# Patient Record
Sex: MALE | Race: WHITE | HISPANIC OR LATINO | URBAN - METROPOLITAN AREA
[De-identification: names, ages, dates, MRNs, and addresses within clinical notes are randomized per-mention and may not be internally consistent; named-entity substitution may affect disease eponyms.]

---

## 2022-09-23 ENCOUNTER — ANESTHESIA (OUTPATIENT)
Dept: SURGERY | Facility: HOSPITAL | Age: 27
End: 2022-09-23

## 2022-09-23 ENCOUNTER — ANESTHESIA EVENT (OUTPATIENT)
Dept: SURGERY | Facility: HOSPITAL | Age: 27
End: 2022-09-23

## 2022-09-23 ENCOUNTER — HOSPITAL ENCOUNTER (INPATIENT)
Facility: HOSPITAL | Age: 27
LOS: 1 days | Discharge: HOME | DRG: 041 | End: 2022-09-23
Attending: EMERGENCY MEDICINE | Admitting: STUDENT IN AN ORGANIZED HEALTH CARE EDUCATION/TRAINING PROGRAM

## 2022-09-23 VITALS
OXYGEN SATURATION: 96 % | TEMPERATURE: 98 F | HEIGHT: 67 IN | DIASTOLIC BLOOD PRESSURE: 99 MMHG | SYSTOLIC BLOOD PRESSURE: 159 MMHG | HEART RATE: 83 BPM | BODY MASS INDEX: 26.68 KG/M2 | WEIGHT: 170 LBS | RESPIRATION RATE: 16 BRPM

## 2022-09-23 DIAGNOSIS — S64.40XA DIGITAL NERVE LACERATION, FINGER, INITIAL ENCOUNTER: ICD-10-CM

## 2022-09-23 DIAGNOSIS — S68.127A: ICD-10-CM

## 2022-09-23 DIAGNOSIS — S65.517A LACERATION OF BLOOD VESSEL OF LEFT LITTLE FINGER, INITIAL ENCOUNTER: Primary | ICD-10-CM

## 2022-09-23 LAB
ALBUMIN SERPL-MCNC: 3.5 GM/DL (ref 3.5–5)
ALBUMIN/GLOB SERPL: 0.9 RATIO (ref 1.1–2)
ALP SERPL-CCNC: 108 UNIT/L (ref 40–150)
ALT SERPL-CCNC: 68 UNIT/L (ref 0–55)
AST SERPL-CCNC: 54 UNIT/L (ref 5–34)
BASOPHILS # BLD AUTO: 0.04 X10(3)/MCL (ref 0–0.2)
BASOPHILS NFR BLD AUTO: 0.4 %
BILIRUBIN DIRECT+TOT PNL SERPL-MCNC: 1.2 MG/DL
BUN SERPL-MCNC: 10.7 MG/DL (ref 8.9–20.6)
CALCIUM SERPL-MCNC: 9 MG/DL (ref 8.4–10.2)
CHLORIDE SERPL-SCNC: 101 MMOL/L (ref 98–107)
CO2 SERPL-SCNC: 25 MMOL/L (ref 22–29)
CREAT SERPL-MCNC: 0.72 MG/DL (ref 0.73–1.18)
EOSINOPHIL # BLD AUTO: 0.36 X10(3)/MCL (ref 0–0.9)
EOSINOPHIL NFR BLD AUTO: 3.7 %
ERYTHROCYTE [DISTWIDTH] IN BLOOD BY AUTOMATED COUNT: 12.1 % (ref 11.5–17)
GFR SERPLBLD CREATININE-BSD FMLA CKD-EPI: >60 MLS/MIN/1.73/M2
GLOBULIN SER-MCNC: 3.7 GM/DL (ref 2.4–3.5)
GLUCOSE SERPL-MCNC: 103 MG/DL (ref 74–100)
GROUP & RH: NORMAL
HCT VFR BLD AUTO: 38.6 % (ref 42–52)
HGB BLD-MCNC: 12.7 GM/DL (ref 14–18)
IMM GRANULOCYTES # BLD AUTO: 0.05 X10(3)/MCL (ref 0–0.04)
IMM GRANULOCYTES NFR BLD AUTO: 0.5 %
INDIRECT COOMBS GEL: NORMAL
LYMPHOCYTES # BLD AUTO: 3.21 X10(3)/MCL (ref 0.6–4.6)
LYMPHOCYTES NFR BLD AUTO: 33.1 %
MCH RBC QN AUTO: 33 PG (ref 27–31)
MCHC RBC AUTO-ENTMCNC: 32.9 MG/DL (ref 33–36)
MCV RBC AUTO: 100.3 FL (ref 80–94)
MONOCYTES # BLD AUTO: 1.02 X10(3)/MCL (ref 0.1–1.3)
MONOCYTES NFR BLD AUTO: 10.5 %
NEUTROPHILS # BLD AUTO: 5 X10(3)/MCL (ref 2.1–9.2)
NEUTROPHILS NFR BLD AUTO: 51.8 %
NRBC BLD AUTO-RTO: 0 %
PLATELET # BLD AUTO: 246 X10(3)/MCL (ref 130–400)
PMV BLD AUTO: 9.1 FL (ref 7.4–10.4)
POTASSIUM SERPL-SCNC: 3.8 MMOL/L (ref 3.5–5.1)
PROT SERPL-MCNC: 7.2 GM/DL (ref 6.4–8.3)
RBC # BLD AUTO: 3.85 X10(6)/MCL (ref 4.7–6.1)
SODIUM SERPL-SCNC: 136 MMOL/L (ref 136–145)
WBC # SPEC AUTO: 9.7 X10(3)/MCL (ref 4.5–11.5)

## 2022-09-23 PROCEDURE — 26356 PR REPAIR FLEX TENDON,ZONE 2,HAND: ICD-10-PCS | Mod: F4,,, | Performed by: STUDENT IN AN ORGANIZED HEALTH CARE EDUCATION/TRAINING PROGRAM

## 2022-09-23 PROCEDURE — 26735 TREAT FINGER FRACTURE EACH: CPT | Mod: 51,F4,, | Performed by: STUDENT IN AN ORGANIZED HEALTH CARE EDUCATION/TRAINING PROGRAM

## 2022-09-23 PROCEDURE — 25000003 PHARM REV CODE 250: Performed by: NURSE ANESTHETIST, CERTIFIED REGISTERED

## 2022-09-23 PROCEDURE — 63600175 PHARM REV CODE 636 W HCPCS: Performed by: NURSE ANESTHETIST, CERTIFIED REGISTERED

## 2022-09-23 PROCEDURE — 36415 COLL VENOUS BLD VENIPUNCTURE: CPT | Performed by: EMERGENCY MEDICINE

## 2022-09-23 PROCEDURE — 85025 COMPLETE CBC W/AUTO DIFF WBC: CPT | Performed by: EMERGENCY MEDICINE

## 2022-09-23 PROCEDURE — 96374 THER/PROPH/DIAG INJ IV PUSH: CPT

## 2022-09-23 PROCEDURE — 63600175 PHARM REV CODE 636 W HCPCS: Performed by: STUDENT IN AN ORGANIZED HEALTH CARE EDUCATION/TRAINING PROGRAM

## 2022-09-23 PROCEDURE — 63600175 PHARM REV CODE 636 W HCPCS: Performed by: EMERGENCY MEDICINE

## 2022-09-23 PROCEDURE — 76942 ECHO GUIDE FOR BIOPSY: CPT | Performed by: ANESTHESIOLOGY

## 2022-09-23 PROCEDURE — 37000009 HC ANESTHESIA EA ADD 15 MINS: Performed by: STUDENT IN AN ORGANIZED HEALTH CARE EDUCATION/TRAINING PROGRAM

## 2022-09-23 PROCEDURE — 37000008 HC ANESTHESIA 1ST 15 MINUTES: Performed by: STUDENT IN AN ORGANIZED HEALTH CARE EDUCATION/TRAINING PROGRAM

## 2022-09-23 PROCEDURE — 27201423 OPTIME MED/SURG SUP & DEVICES STERILE SUPPLY: Performed by: STUDENT IN AN ORGANIZED HEALTH CARE EDUCATION/TRAINING PROGRAM

## 2022-09-23 PROCEDURE — C1762 CONN TISS, HUMAN(INC FASCIA): HCPCS | Performed by: STUDENT IN AN ORGANIZED HEALTH CARE EDUCATION/TRAINING PROGRAM

## 2022-09-23 PROCEDURE — 64721 PR REVISE MEDIAN N/CARPAL TUNNEL SURG: ICD-10-PCS | Mod: 51,LT,, | Performed by: STUDENT IN AN ORGANIZED HEALTH CARE EDUCATION/TRAINING PROGRAM

## 2022-09-23 PROCEDURE — 26356 REPAIR FINGER/HAND TENDON: CPT | Mod: F4,,, | Performed by: STUDENT IN AN ORGANIZED HEALTH CARE EDUCATION/TRAINING PROGRAM

## 2022-09-23 PROCEDURE — 99285 EMERGENCY DEPT VISIT HI MDM: CPT | Mod: 25

## 2022-09-23 PROCEDURE — 99223 PR INITIAL HOSPITAL CARE,LEVL III: ICD-10-PCS | Mod: 57,,, | Performed by: STUDENT IN AN ORGANIZED HEALTH CARE EDUCATION/TRAINING PROGRAM

## 2022-09-23 PROCEDURE — 26418 REPAIR FINGER TENDON: CPT | Mod: 51,F4,, | Performed by: STUDENT IN AN ORGANIZED HEALTH CARE EDUCATION/TRAINING PROGRAM

## 2022-09-23 PROCEDURE — 80053 COMPREHEN METABOLIC PANEL: CPT | Performed by: EMERGENCY MEDICINE

## 2022-09-23 PROCEDURE — 86901 BLOOD TYPING SEROLOGIC RH(D): CPT | Performed by: EMERGENCY MEDICINE

## 2022-09-23 PROCEDURE — 25000003 PHARM REV CODE 250: Performed by: EMERGENCY MEDICINE

## 2022-09-23 PROCEDURE — 63600175 PHARM REV CODE 636 W HCPCS

## 2022-09-23 PROCEDURE — 64721 CARPAL TUNNEL SURGERY: CPT | Mod: 51,LT,, | Performed by: STUDENT IN AN ORGANIZED HEALTH CARE EDUCATION/TRAINING PROGRAM

## 2022-09-23 PROCEDURE — 36000709 HC OR TIME LEV III EA ADD 15 MIN: Performed by: STUDENT IN AN ORGANIZED HEALTH CARE EDUCATION/TRAINING PROGRAM

## 2022-09-23 PROCEDURE — 64912 PR NERVE REPAIR, W/NERVE ALLOGRAFT, 1ST STRAND: ICD-10-PCS | Mod: 51,F4,, | Performed by: STUDENT IN AN ORGANIZED HEALTH CARE EDUCATION/TRAINING PROGRAM

## 2022-09-23 PROCEDURE — 25000003 PHARM REV CODE 250: Performed by: STUDENT IN AN ORGANIZED HEALTH CARE EDUCATION/TRAINING PROGRAM

## 2022-09-23 PROCEDURE — 26418 PR REPAIR EXTEN TENDON,DORSUM FINGR,EA: ICD-10-PCS | Mod: 51,F4,, | Performed by: STUDENT IN AN ORGANIZED HEALTH CARE EDUCATION/TRAINING PROGRAM

## 2022-09-23 PROCEDURE — 11012 PR DEBRIDE ASSOC OPEN FX/DISLO SKIN/MUS/BONE: ICD-10-PCS | Mod: 51,,, | Performed by: STUDENT IN AN ORGANIZED HEALTH CARE EDUCATION/TRAINING PROGRAM

## 2022-09-23 PROCEDURE — 26735 PR OPEN TX PHALANGEAL SHAFT FRACTURE PROX/MIDDLE EA: ICD-10-PCS | Mod: 51,F4,, | Performed by: STUDENT IN AN ORGANIZED HEALTH CARE EDUCATION/TRAINING PROGRAM

## 2022-09-23 PROCEDURE — 11012 DEB SKIN BONE AT FX SITE: CPT | Mod: 51,,, | Performed by: STUDENT IN AN ORGANIZED HEALTH CARE EDUCATION/TRAINING PROGRAM

## 2022-09-23 PROCEDURE — 36000708 HC OR TIME LEV III 1ST 15 MIN: Performed by: STUDENT IN AN ORGANIZED HEALTH CARE EDUCATION/TRAINING PROGRAM

## 2022-09-23 PROCEDURE — 71000033 HC RECOVERY, INTIAL HOUR: Performed by: STUDENT IN AN ORGANIZED HEALTH CARE EDUCATION/TRAINING PROGRAM

## 2022-09-23 PROCEDURE — 64912 NRV RPR W/NRV ALGRFT 1ST: CPT | Mod: 51,F4,, | Performed by: STUDENT IN AN ORGANIZED HEALTH CARE EDUCATION/TRAINING PROGRAM

## 2022-09-23 PROCEDURE — 96375 TX/PRO/DX INJ NEW DRUG ADDON: CPT

## 2022-09-23 PROCEDURE — 63600175 PHARM REV CODE 636 W HCPCS: Performed by: ANESTHESIOLOGY

## 2022-09-23 PROCEDURE — 11000001 HC ACUTE MED/SURG PRIVATE ROOM

## 2022-09-23 PROCEDURE — 99223 1ST HOSP IP/OBS HIGH 75: CPT | Mod: 57,,, | Performed by: STUDENT IN AN ORGANIZED HEALTH CARE EDUCATION/TRAINING PROGRAM

## 2022-09-23 DEVICE — IMPLANTABLE DEVICE: Type: IMPLANTABLE DEVICE | Site: FINGER | Status: FUNCTIONAL

## 2022-09-23 RX ORDER — OXYCODONE HYDROCHLORIDE 5 MG/1
5 TABLET ORAL EVERY 6 HOURS PRN
Qty: 25 TABLET | Refills: 0 | Status: SHIPPED | OUTPATIENT
Start: 2022-09-23

## 2022-09-23 RX ORDER — GLYCOPYRROLATE 0.2 MG/ML
INJECTION INTRAMUSCULAR; INTRAVENOUS
Status: DISCONTINUED | OUTPATIENT
Start: 2022-09-23 | End: 2022-09-23

## 2022-09-23 RX ORDER — CEPHALEXIN 500 MG/1
500 CAPSULE ORAL EVERY 6 HOURS
Qty: 40 CAPSULE | Refills: 0 | Status: SHIPPED | OUTPATIENT
Start: 2022-09-23 | End: 2022-10-03

## 2022-09-23 RX ORDER — CEFAZOLIN SODIUM 2 G/100ML
2 INJECTION, SOLUTION INTRAVENOUS ONCE
Status: COMPLETED | OUTPATIENT
Start: 2022-09-23 | End: 2022-09-23

## 2022-09-23 RX ORDER — PROPOFOL 10 MG/ML
VIAL (ML) INTRAVENOUS CONTINUOUS PRN
Status: DISCONTINUED | OUTPATIENT
Start: 2022-09-23 | End: 2022-09-23

## 2022-09-23 RX ORDER — LIDOCAINE HYDROCHLORIDE 20 MG/ML
INJECTION INTRAVENOUS
Status: DISCONTINUED
Start: 2022-09-23 | End: 2022-09-23 | Stop reason: HOSPADM

## 2022-09-23 RX ORDER — MIDAZOLAM HYDROCHLORIDE 1 MG/ML
2 INJECTION INTRAMUSCULAR; INTRAVENOUS ONCE AS NEEDED
Status: DISCONTINUED | OUTPATIENT
Start: 2022-09-23 | End: 2022-09-23 | Stop reason: HOSPADM

## 2022-09-23 RX ORDER — MIDAZOLAM HYDROCHLORIDE 1 MG/ML
2 INJECTION INTRAMUSCULAR; INTRAVENOUS ONCE
Status: COMPLETED | OUTPATIENT
Start: 2022-09-23 | End: 2022-09-23

## 2022-09-23 RX ORDER — SODIUM CHLORIDE 0.9 % (FLUSH) 0.9 %
10 SYRINGE (ML) INJECTION
Status: DISCONTINUED | OUTPATIENT
Start: 2022-09-23 | End: 2022-09-23 | Stop reason: HOSPADM

## 2022-09-23 RX ORDER — ONDANSETRON 2 MG/ML
4 INJECTION INTRAMUSCULAR; INTRAVENOUS
Status: COMPLETED | OUTPATIENT
Start: 2022-09-23 | End: 2022-09-23

## 2022-09-23 RX ORDER — HYDROMORPHONE HYDROCHLORIDE 2 MG/ML
0.2 INJECTION, SOLUTION INTRAMUSCULAR; INTRAVENOUS; SUBCUTANEOUS EVERY 5 MIN PRN
Status: DISCONTINUED | OUTPATIENT
Start: 2022-09-23 | End: 2022-09-23 | Stop reason: HOSPADM

## 2022-09-23 RX ORDER — TALC
6 POWDER (GRAM) TOPICAL NIGHTLY PRN
Status: DISCONTINUED | OUTPATIENT
Start: 2022-09-23 | End: 2022-09-23 | Stop reason: HOSPADM

## 2022-09-23 RX ORDER — FAMOTIDINE 20 MG/1
20 TABLET, FILM COATED ORAL 2 TIMES DAILY
Status: DISCONTINUED | OUTPATIENT
Start: 2022-09-23 | End: 2022-09-23 | Stop reason: HOSPADM

## 2022-09-23 RX ORDER — POLYETHYLENE GLYCOL 3350 17 G/17G
17 POWDER, FOR SOLUTION ORAL DAILY
Status: DISCONTINUED | OUTPATIENT
Start: 2022-09-23 | End: 2022-09-23 | Stop reason: HOSPADM

## 2022-09-23 RX ORDER — HEPARIN SODIUM 5000 [USP'U]/ML
INJECTION, SOLUTION INTRAVENOUS; SUBCUTANEOUS
Status: DISCONTINUED | OUTPATIENT
Start: 2022-09-23 | End: 2022-09-23 | Stop reason: HOSPADM

## 2022-09-23 RX ORDER — MORPHINE SULFATE 4 MG/ML
4 INJECTION, SOLUTION INTRAMUSCULAR; INTRAVENOUS EVERY 4 HOURS PRN
Status: DISCONTINUED | OUTPATIENT
Start: 2022-09-23 | End: 2022-09-23 | Stop reason: HOSPADM

## 2022-09-23 RX ORDER — SODIUM CHLORIDE, SODIUM LACTATE, POTASSIUM CHLORIDE, CALCIUM CHLORIDE 600; 310; 30; 20 MG/100ML; MG/100ML; MG/100ML; MG/100ML
INJECTION, SOLUTION INTRAVENOUS CONTINUOUS
Status: DISCONTINUED | OUTPATIENT
Start: 2022-09-23 | End: 2022-09-23 | Stop reason: HOSPADM

## 2022-09-23 RX ORDER — ONDANSETRON 2 MG/ML
4 INJECTION INTRAMUSCULAR; INTRAVENOUS ONCE AS NEEDED
Status: DISCONTINUED | OUTPATIENT
Start: 2022-09-23 | End: 2022-09-23 | Stop reason: HOSPADM

## 2022-09-23 RX ORDER — HEPARIN SODIUM 5000 [USP'U]/ML
INJECTION, SOLUTION INTRAVENOUS; SUBCUTANEOUS
Status: DISCONTINUED
Start: 2022-09-23 | End: 2022-09-23 | Stop reason: HOSPADM

## 2022-09-23 RX ORDER — MUPIROCIN 20 MG/G
OINTMENT TOPICAL 2 TIMES DAILY
Status: DISCONTINUED | OUTPATIENT
Start: 2022-09-23 | End: 2022-09-23 | Stop reason: HOSPADM

## 2022-09-23 RX ORDER — LIDOCAINE HYDROCHLORIDE 10 MG/ML
1 INJECTION, SOLUTION EPIDURAL; INFILTRATION; INTRACAUDAL; PERINEURAL ONCE
Status: DISCONTINUED | OUTPATIENT
Start: 2022-09-23 | End: 2022-09-23 | Stop reason: HOSPADM

## 2022-09-23 RX ORDER — OXYCODONE AND ACETAMINOPHEN 5; 325 MG/1; MG/1
1 TABLET ORAL EVERY 4 HOURS PRN
Status: DISCONTINUED | OUTPATIENT
Start: 2022-09-23 | End: 2022-09-23 | Stop reason: HOSPADM

## 2022-09-23 RX ORDER — MORPHINE SULFATE 4 MG/ML
4 INJECTION, SOLUTION INTRAMUSCULAR; INTRAVENOUS
Status: COMPLETED | OUTPATIENT
Start: 2022-09-23 | End: 2022-09-23

## 2022-09-23 RX ORDER — ONDANSETRON 2 MG/ML
4 INJECTION INTRAMUSCULAR; INTRAVENOUS EVERY 6 HOURS PRN
Status: DISCONTINUED | OUTPATIENT
Start: 2022-09-23 | End: 2022-09-23 | Stop reason: HOSPADM

## 2022-09-23 RX ORDER — CEFAZOLIN SODIUM IN 0.9 % NACL 2 G/100 ML
PLASTIC BAG, INJECTION (ML) INTRAVENOUS
Status: DISCONTINUED | OUTPATIENT
Start: 2022-09-23 | End: 2022-09-23

## 2022-09-23 RX ORDER — MIDAZOLAM HYDROCHLORIDE 1 MG/ML
INJECTION INTRAMUSCULAR; INTRAVENOUS
Status: COMPLETED
Start: 2022-09-23 | End: 2022-09-23

## 2022-09-23 RX ORDER — LIDOCAINE HYDROCHLORIDE 20 MG/ML
INJECTION, SOLUTION EPIDURAL; INFILTRATION; INTRACAUDAL; PERINEURAL
Status: DISCONTINUED | OUTPATIENT
Start: 2022-09-23 | End: 2022-09-23 | Stop reason: HOSPADM

## 2022-09-23 RX ORDER — HYDROMORPHONE HYDROCHLORIDE 2 MG/ML
1 INJECTION, SOLUTION INTRAMUSCULAR; INTRAVENOUS; SUBCUTANEOUS EVERY 4 HOURS PRN
Status: DISCONTINUED | OUTPATIENT
Start: 2022-09-23 | End: 2022-09-23 | Stop reason: SDUPTHER

## 2022-09-23 RX ADMIN — ONDANSETRON 4 MG: 2 INJECTION INTRAMUSCULAR; INTRAVENOUS at 01:09

## 2022-09-23 RX ADMIN — DEXTROSE MONOHYDRATE 1 G: 5 INJECTION INTRAVENOUS at 03:09

## 2022-09-23 RX ADMIN — MIDAZOLAM HYDROCHLORIDE 2 MG: 1 INJECTION INTRAMUSCULAR; INTRAVENOUS at 12:09

## 2022-09-23 RX ADMIN — GLYCOPYRROLATE 0.1 MG: 0.2 INJECTION INTRAMUSCULAR; INTRAVENOUS at 01:09

## 2022-09-23 RX ADMIN — ONDANSETRON HYDROCHLORIDE 4 MG: 2 SOLUTION INTRAMUSCULAR; INTRAVENOUS at 04:09

## 2022-09-23 RX ADMIN — Medication 2 G: at 01:09

## 2022-09-23 RX ADMIN — SODIUM CHLORIDE, POTASSIUM CHLORIDE, SODIUM LACTATE AND CALCIUM CHLORIDE: 600; 310; 30; 20 INJECTION, SOLUTION INTRAVENOUS at 03:09

## 2022-09-23 RX ADMIN — MORPHINE SULFATE 4 MG: 4 INJECTION INTRAVENOUS at 01:09

## 2022-09-23 RX ADMIN — PROPOFOL 100 MCG/KG/MIN: 10 INJECTION, EMULSION INTRAVENOUS at 01:09

## 2022-09-23 RX ADMIN — MEPIVACAINE HYDROCHLORIDE 50 ML: 15 INJECTION, SOLUTION EPIDURAL; INFILTRATION at 12:09

## 2022-09-23 RX ADMIN — SODIUM CHLORIDE, SODIUM GLUCONATE, SODIUM ACETATE, POTASSIUM CHLORIDE AND MAGNESIUM CHLORIDE: 526; 502; 368; 37; 30 INJECTION, SOLUTION INTRAVENOUS at 01:09

## 2022-09-23 RX ADMIN — OXYCODONE HYDROCHLORIDE AND ACETAMINOPHEN 1 TABLET: 5; 325 TABLET ORAL at 06:09

## 2022-09-23 RX ADMIN — CEFAZOLIN SODIUM 2000 MG: 2 INJECTION, SOLUTION INTRAVENOUS at 06:09

## 2022-09-23 RX ADMIN — MIDAZOLAM 2 MG: 1 INJECTION INTRAMUSCULAR; INTRAVENOUS at 12:09

## 2022-09-23 NOTE — ANESTHESIA PROCEDURE NOTES
Peripheral Block    Patient location during procedure: pre-op    Reason for block: primary anesthetic    Diagnosis: Laceration Digital Extensor Tendon & Artery, Open Fracture Proximal Phalanx, Left Little Finger   Start time: 9/23/2022 12:47 PM  Timeout: 9/23/2022 12:46 PM   End time: 9/23/2022 12:51 PM    Staffing  Authorizing Provider: Fredy Crawley MD  Performing Provider: Fredy Crawley MD    Preanesthetic Checklist  Completed: patient identified, IV checked, site marked, risks and benefits discussed, surgical consent, monitors and equipment checked, pre-op evaluation and timeout performed  Peripheral Block  Patient position: supine  Prep: ChloraPrep  Patient monitoring: heart rate, cardiac monitor, continuous pulse ox, continuous capnometry and frequent blood pressure checks  Block type: axillary  Laterality: left  Injection technique: single shot  Needle  Needle type: Stimuplex   Needle gauge: 22 G  Needle length: 2 in  Needle localization: anatomical landmarks, ultrasound guidance and nerve stimulator   -ultrasound image captured on disc.  Assessment  Injection assessment: negative aspiration and negative parasthesia  Paresthesia pain: none  Heart rate change: no  Slow fractionated injection: yes  Pain Tolerance: comfortable throughout block and no complaints  Medications:    Medications: mepivacaine (CARBOCAINE) injection 15 mg/mL (1.5%) - Perineural   50 mL - 9/23/2022 12:48:00 PM    Additional Notes  VSS.  [facility] RN monitoring vitals throughout procedure. Normal Appearing Axillary Anatomy. Continual Aspiration B/W Injections. Muscle Twitches stopped @ 0.42 mA. Digital Image Recorded.  Patient tolerated procedure well.

## 2022-09-23 NOTE — ED PROVIDER NOTES
Encounter Date: 9/22/2022       History     Chief Complaint   Patient presents with    finger amputation     Transfer from [facility] for partial amputation to left 5th digit from circular saw. 2+ radial pulse. Motor/sensation intact to digit     26 year old male presents to ED as transfer from [facility] for partial amputation of left 5th digit by circular saw onset yesterday. Pt reports that he still has feeling in his Pt finger. Pt reports history of occasional smoking. Pt reports drinking a soda about an hour ago.     The history is provided by the patient and medical records. No  was used.   Hand Injury   The incident occurred yesterday. The incident occurred at work. Injury mechanism: circular saw cut. The pain is present in the left fingers. The pain is at a severity of 5/10. The pain has been Constant since the incident. Pertinent negatives include no fever. He reports no foreign bodies present. The symptoms are aggravated by movement and palpation. He has tried immobilization for the symptoms.   Review of patient's allergies indicates:  No Known Allergies  History reviewed. No pertinent past medical history.  History reviewed. No pertinent surgical history.  History reviewed. No pertinent family history.     Review of Systems   Constitutional:  Negative for appetite change, chills and fever.   HENT:  Negative for congestion and sore throat.    Eyes:  Negative for pain and visual disturbance.   Respiratory:  Negative for cough and shortness of breath.    Cardiovascular:  Negative for chest pain and leg swelling.   Gastrointestinal:  Negative for abdominal pain, diarrhea, nausea and vomiting.   Genitourinary:  Negative for dysuria and hematuria.   Skin:  Positive for wound. Negative for rash.   Allergic/Immunologic: Negative for food allergies and immunocompromised state.   Neurological:  Negative for seizures, syncope and weakness.     Physical Exam     Initial  Vitals [09/23/22 0048]   BP Pulse Resp Temp SpO2   (!) 156/89 87 16 97.9 °F (36.6 °C) 95 %      MAP       --                 Physical Exam    Nursing note and vitals reviewed.  Constitutional: He appears well-developed and well-nourished. He is not diaphoretic. He does not appear ill. No distress.   HENT:   Head: Normocephalic and atraumatic.   Right Ear: External ear normal.   Left Ear: External ear normal.   Nose: Nose normal.   Mouth/Throat: Oropharynx is clear and moist.   Eyes: Conjunctivae and EOM are normal. Pupils are equal, round, and reactive to light.   Neck: Neck supple. No tracheal deviation present.   Cardiovascular:  Normal rate, regular rhythm, normal heart sounds and intact distal pulses.           No murmur heard.  Pulmonary/Chest: Breath sounds normal. No respiratory distress.   Abdominal: Abdomen is soft. He exhibits no distension.   No right CVA tenderness.  No left CVA tenderness.   Musculoskeletal:      Cervical back: Neck supple.      Comments: Near complete amputation of left 5th digit just distal to MCP, brisk capillary refill, no active bleeding, sensation intact.     Neurological: He is alert and oriented to person, place, and time. He has normal strength. No cranial nerve deficit or sensory deficit. GCS score is 15. GCS eye subscore is 4. GCS verbal subscore is 5. GCS motor subscore is 6.   Skin: Skin is warm and dry. Capillary refill takes less than 2 seconds.   Psychiatric: He has a normal mood and affect. His mood appears not anxious.       ED Course   Procedures  Labs Reviewed   COMPREHENSIVE METABOLIC PANEL - Abnormal; Notable for the following components:       Result Value    Glucose Level 103 (*)     Creatinine 0.72 (*)     Globulin 3.7 (*)     Albumin/Globulin Ratio 0.9 (*)     Alanine Aminotransferase 68 (*)     Aspartate Aminotransferase 54 (*)     All other components within normal limits   CBC WITH DIFFERENTIAL - Abnormal; Notable for the following components:    RBC 3.85  (*)     Hgb 12.7 (*)     Hct 38.6 (*)     .3 (*)     MCH 33.0 (*)     MCHC 32.9 (*)     IG# 0.05 (*)     All other components within normal limits   CBC W/ AUTO DIFFERENTIAL    Narrative:     The following orders were created for panel order CBC auto differential.  Procedure                               Abnormality         Status                     ---------                               -----------         ------                     CBC with Differential[[id number]]        Abnormal            Final result                 Please view results for these tests on the individual orders.   TYPE & SCREEN          Imaging Results    None          Medications   morphine injection 4 mg (4 mg Intravenous Given 9/23/22 0145)   ondansetron injection 4 mg (4 mg Intravenous Given 9/23/22 0145)   MEPIVAcaine (CARBocaine) 15 mg/mL (1.5 %) injection (  Override pull for Anesthesia 9/23/22 1311)   midazolam (VERSED) 1 mg/mL injection 2 mg (2 mg Intravenous Given 9/23/22 1236)   ceFAZolin in dextrose (iso-os) 2 gram/100 mL PgBk 2,000 mg (0 mg Intravenous Stopped 9/23/22 1853)     Medical Decision Making:   History:   Old Records Summarized: records from another hospital.       <> Summary of Records: Transferred from [facility] with partial finger amputation. Received Ancef and tetanus   Initial Assessment:   Near complete finger amputation but has cap refill and sensation  Clinical Tests:   Radiological Study: Reviewed  ED Management:  Loosely wrapped with non-adherent dressing  Spoke with ortho- will take to OR in AM   Other:   I have discussed this case with another health care provider.           ED Course as of 09/26/22 1015   Fri Sep 23, 2022   0158 Spoke with Dr Thompson (ortho)- keep NPO and will take patient to OR in AM  [KM]      ED Course User Index  [KM] Em Astorga MD                   Clinical Impression:   Final diagnoses:  [S68.006A] Partial traumatic amputation of left little finger through  metacarpophalangeal (MCP) joint, initial encounter      ED Disposition Condition    Admit Stable                Em Astorga MD  09/26/22 8422

## 2022-09-23 NOTE — Clinical Note
Diagnosis: Partial traumatic amputation of left little finger through metacarpophalangeal (MCP) joint, initial encounter [[id number]]  Admitting Provider:: LUCY GUY [[id number]]  Future Attending Provider: LUCY GUY [[id number]]  Reason for IP Medical  Treatment  (Clinical interventions that can only be accomplished in the IP setting? ) :: OR  Estimated Length of Stay:: 2 midnights  I certify that Inpatient services for greater than or equal to 2 midnights are medically necessary:: Yes  Plans for P ost-Acute care--if anticipated (pick the single best option):: A. No post acute care anticipated at this time

## 2022-09-23 NOTE — OR NURSING
12:35  TIMEOUT DONE    12:47  DR. ALEXANDER WILL ATTEMPT TO DO A LEFT AXILLARY NERVE BLOCK.    12:51  BLOCK COMPLETED AND TOLERATED WELL.

## 2022-09-23 NOTE — H&P
"Orthopedic Surgery Consult Note    Reason for Consult:  Left small finger table saw injury    HPI:  Patient is a 18-year-old male  who presents to the ED as a transfer from [place] for a table saw injury to left small finger.  He has severe pain in his left small finger that is localized to the injury site.  It does not radiate.  He has some sensation his fingertip is probably numb.  He smokes rarely and is otherwise healthy      PMH: History reviewed. No pertinent past medical history.    PSH: History reviewed. No pertinent surgical history.    Med:   No current facility-administered medications on file prior to encounter.     No current outpatient medications on file prior to encounter.       Allergies: Review of patient's allergies indicates:  No Known Allergies    SH:      FH: None    ROS:   Constitutional: Denies fever chills  Eyes: No change in vision  ENT: No ringing or current infections  CV: No chest pain  Resp: No labored breathing  MSK: Pain evident at site of injury located in HPI,   Integ: No signs of abrasions or lacerations  Neuro: No numbness or tingling  Lymphatic: No swelling outside the area of injury     BP (!) 163/96   Pulse 78   Temp 97.9 °F (36.6 °C) (Temporal)   Resp 18   Ht 5' 7" (1.702 m)   Wt 77.1 kg (170 lb)   SpO2 99%   BMI 26.63 kg/m²   NAD, Alert, Awake, Ox4   HEENT: atraumatic, normal cephalic, neg ecchymosis, lacerations   CV: No increased work of breathing   Pulm: Normal work of breathing   Skin: No cutaneous rash, no open wounds   Vascular: 2+ RP, wwp, bcr   Left small finger:  Partial amputation of the small finger at the level of the proximal phalanx.  There is a radial sided skin bridge.  He has partial sensation to the radial aspect of the small finger tip.  He has capillary refill of 3 seconds.  Pulse ox measures 96% when placed on the small finger         Imaging:  X-ray of the left hand shows fracture with soft tissue disruption to the proximal phalanx of left " small finger      A/P:   Left small finger open fracture proximal phalanx  Left small finger ulnar digital nerve and artery laceration  Left small finger FDS and FDP lacerations  Left small finger extensor laceration    I will take this patient to surgery in the afternoon.  We will keep him NPO.  Admit him to my service.  I explained that I would try to repair illness injured structures but I explained that the results or this may not be great.  He may need amputation in the future.  He would like to save his finger at all costs.      I will add him on for open reduction internal fixation left small finger proximal phalanx, left small finger ulnar digital artery repair, left small finger ulnar digital nerve reconstruction with allograft.  Left small finger FDS and FDP tendon repair, left small finger extensor tendon repair

## 2022-09-23 NOTE — ED NOTES
Pt presents to ED as a transfer from [place] for partial amputation of 5th digit on left hand. Pt states he was using a saw at work and the saw slipped. Digit has brisk cap refill with good perfusion. Digit is warm with no discoloration. Pt received 1g ancef PTA.

## 2022-09-23 NOTE — ANESTHESIA POSTPROCEDURE EVALUATION
Anesthesia Post Evaluation    Patient: Mynor Tang    Procedure(s) Performed: Procedure(s) (LRB):  ORIF, FINGER (Left)  REPAIR, NERVE (Left)  REPAIR, TENDON, FLEXOR (Left)    Final Anesthesia Type: general      Patient location during evaluation: PACU  Patient participation: Yes- Able to Participate  Level of consciousness: oriented and awake  Post-procedure vital signs: reviewed and stable  Pain management: adequate  Airway patency: patent    PONV status at discharge: No PONV  Anesthetic complications: no      Cardiovascular status: stable and hemodynamically stable  Respiratory status: spontaneous ventilation and unassisted  Hydration status: euvolemic  Follow-up not needed.  Comments: [facility]      Axillary Blk: resolving    Vitals Value Taken Time   /88 09/23/22 1641   Temp 36.7 09/23/22 1643   Pulse 87 09/23/22 1642   Resp 15 09/23/22 1642   SpO2 99 % 09/23/22 1642   Vitals shown include unvalidated device data.      No case tracking events are documented in the log.      Pain/Teofilo Score: Pain Rating Prior to Med Admin: 10 (9/23/2022  1:43 AM)

## 2022-09-23 NOTE — CONSULTS
"Orthopedic Surgery Consult Note    Reason for Consult:  Left small finger table saw injury    HPI:  Patient is a 18-year-old male  who presents to the ED as a transfer from [place] for a table saw injury to left small finger.  He has severe pain in his left small finger that is localized to the injury site.  It does not radiate.  He has some sensation his fingertip is probably numb.  He smokes rarely and is otherwise healthy      PMH: History reviewed. No pertinent past medical history.    PSH: History reviewed. No pertinent surgical history.    Med:   No current facility-administered medications on file prior to encounter.     No current outpatient medications on file prior to encounter.       Allergies: Review of patient's allergies indicates:  No Known Allergies    SH:      FH: None    ROS:   Constitutional: Denies fever chills  Eyes: No change in vision  ENT: No ringing or current infections  CV: No chest pain  Resp: No labored breathing  MSK: Pain evident at site of injury located in HPI,   Integ: No signs of abrasions or lacerations  Neuro: No numbness or tingling  Lymphatic: No swelling outside the area of injury     BP (!) 163/96   Pulse 78   Temp 97.9 °F (36.6 °C) (Temporal)   Resp 18   Ht 5' 7" (1.702 m)   Wt 77.1 kg (170 lb)   SpO2 99%   BMI 26.63 kg/m²   NAD, Alert, Awake, Ox4   HEENT: atraumatic, normal cephalic, neg ecchymosis, lacerations   CV: No increased work of breathing   Pulm: Normal work of breathing   Skin: No cutaneous rash, no open wounds   Vascular: 2+ RP, wwp, bcr   Left small finger:  Partial amputation of the small finger at the level of the proximal phalanx.  There is a radial sided skin bridge.  He has partial sensation to the radial aspect of the small finger tip.  He has capillary refill of 3 seconds.  Pulse ox measures 96% when placed on the small finger         Imaging:  X-ray of the left hand shows fracture with soft tissue disruption to the proximal phalanx of left " small finger      A/P:   Left small finger open fracture proximal phalanx  Left small finger ulnar digital nerve and artery laceration  Left small finger FDS and FDP lacerations  Left small finger extensor laceration    I will take this patient to surgery in the afternoon.  We will keep him NPO.  Admit him to my service.  I explained that I would try to repair illness injured structures but I explained that the results or this may not be great.  He may need amputation in the future.  He would like to save his finger at all costs.      I will add him on for open reduction internal fixation left small finger proximal phalanx, left small finger ulnar digital artery repair, left small finger ulnar digital nerve reconstruction with allograft.  Left small finger FDS and FDP tendon repair, left small finger extensor tendon repair

## 2022-09-23 NOTE — TRANSFER OF CARE
"Anesthesia Transfer of Care Note    Patient: Mynor Tang    Procedure(s) Performed: Procedure(s) (LRB):  ORIF, FINGER (Left)  REPAIR, NERVE (Left)  REPAIR, TENDON, FLEXOR (Left)    Patient location: PACU    Anesthesia Type: general and regional    Transport from OR: Transported from OR on room air with adequate spontaneous ventilation    Post pain: adequate analgesia    Post assessment: no apparent anesthetic complications    Post vital signs: stable    Level of consciousness: awake    Nausea/Vomiting: no nausea/vomiting    Complications: none    Transfer of care protocol was followed      Last vitals:   Visit Vitals  /76   Pulse 68   Temp 36.7 °C (98.1 °F) (Oral)   Resp 18   Ht 5' 7" (1.702 m)   Wt 77.1 kg (170 lb)   SpO2 98%   BMI 26.63 kg/m²     "

## 2022-09-23 NOTE — DISCHARGE INSTRUCTIONS
[facility]  [address]  [place] [address]  Phone [phone]       /      Fax [phone]  SURGEON:  DR. THOMPSON    **All questions or concerns should be handled at your surgeon's office ([phone]). If it is a weekend or after hours, you will get the surgeon on call. **    Discharge Instructions  MEDICATIONS:    PAIN MANAGEMENT:   Oxycodone 5/325mg (pain pill)- take 1 tablet every 4-6 hours as needed for pain. As the pain lessens, break each tablet in half and gradually reduce the use. As you decrease Oxycodone, you can increase the Tylenol.     **NO MORE THAN 3000mg OF TYLENOL IN 24 HOURS**.       Use the medication log in your discharge packet to keep track of your medications.       **Other things that help with pain control include: WALKING, COMPRESSION WRAP, ICE and ELEVATION!!**      BLOOD CLOT PREVENTION:     WALK AROUND EVERY 2 HOURS.   Increase walking distance each day.  Stay out of bed as much as possible    CONSTIPATION PREVENTION:   Miralax or Senokot S/Joselyn-colace and Stool softeners every day while on pain meds.  Use other MORE AGGRESSIVE over the counter LAXATIVES as needed for constipation (Examples - Milk of Magnesia, Dulcolax suppositories, Magnesium Citrate, Fleet's enemas...etc).  Drink lots of water  Increase Fiber in diet  Increase walking distance each day - every 2 hours, at least.   If you experience loss of sensation associated with extreme pain, contact Dr. Thompson immediately.     ACTIVITY:   Weight bearing precautions as follows: NON weight bearing to operative leg with a walker. DO NOT TAKE YOURSELF OFF OF THE WALKER TOO SOON. ALLOW YOUR OUTPATIENT THERAPIST TO GUIDE YOU.     No heavy lifting, bending, pushing, or pulling.  We encourage out of bed activities.  Walk around at least every 1-2 hours and switch positions throughout the day.     WOUND CARE:   Splint/Cast - DO NOT CHANGE THE DRESSING. DO NOT STICK ANYTHING INSIDE OF THE DRESSING.  DO NOT WET THE DRESSING.   May wet incision(s) AFTER 2 weeks, once staples are removed at surgeon's office. Ok to shower as long as you do not wet the wound(s). May use plastic barrier such as garbage bag or saran wrap with tape for showering.   DO NOT TOUCH INCISION(S)  Apply ice pack AS MUCH AS POSSIBLE.    URINARY RETENTION:  If you start having difficulty urinating, decrease oxycodone and call your primary care doctor or urologist.     PNEUMONIA PREVENTION:  Stay out of bed as much as possible, walk around at least every 2 hours and continue breathing exercises (Incentive Spirometry) as long as you are limited in mobility and on narcotics.     INFECTION PREVENTION:  Wound care instructions as above.   DO NOT WET YOUR DRESSING/WOUND(S).   NOTIFY YOUR SURGEON OF EXCESSIVE WOUND DRAINAGE.  Use gloves and practice good handwashing before and after dressing changes.  No pets in bed or near wound (s)/dressing(s).  No Alcohol, smoking or tobacco products  IF YOU ARE DIABETIC, maintain good blood sugar control throughout your recovery.    Call your SURGEON'S OFFICE if you experience the following signs and symptoms of infection:   Unusual redness, swelling, excessive, cloudy or foul smelling drainage at the incision site.   Persistent temp greater than 102 F, unrelieved by Tylenol  Pain at surgical site, unrelieved by pain meds  Warning signs of a blood clot in your leg: (CALL YOUR DOCTOR)  New onset or Increasing pain in calf, new onset tenderness or redness above or below the knee or increasing swelling of your calf, ankle, or foot.  Warning signs that a blood clot has traveled to your lungs: (REPORT TO THE ER)  Sudden or increase in Shortness of breath, sudden onset of chest pains, or  Localized chest pain with coughing.     For emergencies, please report to OUR ([facility] or [facility] [place]) Emergency department and tell them to call Dr. GUY at [phone].

## 2022-09-23 NOTE — ANESTHESIA PREPROCEDURE EVALUATION
09/23/2022  Mynor Tang is a 26 y.o., male , who presents for the following:    Procedures:        ORIF, FINGER (Left: Finger)       REPAIR, NERVE (Left)       REPAIR, TENDON, FLEXOR (Left: Hand)   Anesthesia type: Choice   Diagnosis: Laceration of blood vessel of left little finger, initial encounter [Y51.135A]   Pre-op diagnosis: Laceration of blood vessel of left little finger, initial encounter [O09.330B]   Location: [facility] OR  / [facility] OR   Surgeons: Carlos Thompson MD       Pre-op Assessment    I have reviewed the Patient Summary Reports.    I have reviewed the NPO Status.   I have reviewed the Medications.     Review of Systems  Social:  Smoker    Cardiovascular:  Cardiovascular Normal     Pulmonary:  Pulmonary Normal    Hepatic/GI:  Hepatic/GI Normal    Endocrine:  Endocrine Normal        Physical Exam  General: Alert and Oriented    Airway:  Mallampati: II   Mouth Opening: Normal  TM Distance: Normal  Tongue: Normal  Neck ROM: Normal ROM    Dental:  Intact    Chest/Lungs:  Normal Respiratory Rate    Heart:  Rate: Normal  Rhythm: Regular Rhythm         Latest Reference Range & Units 09/23/22 02:28   WBC 4.5 - 11.5 x10(3)/mcL 9.7   RBC 4.70 - 6.10 x10(6)/mcL 3.85 (L)   Hemoglobin 14.0 - 18.0 gm/dL 12.7 (L)   Hematocrit 42.0 - 52.0 % 38.6 (L)   MCV 80.0 - 94.0 fL 100.3 (H)   MCH 27.0 - 31.0 pg 33.0 (H)   MCHC 33.0 - 36.0 mg/dL 32.9 (L)   RDW 11.5 - 17.0 % 12.1   Platelets 130 - 400 x10(3)/mcL 246   (L): Data is abnormally low  (H): Data is abnormally high   Latest Reference Range & Units 09/23/22 02:28   Sodium 136 - 145 mmol/L 136   Potassium 3.5 - 5.1 mmol/L 3.8   Chloride 98 - 107 mmol/L 101   CO2 22 - 29 mmol/L 25   BUN 8.9 - 20.6 mg/dL 10.7   Creatinine 0.73 - 1.18 mg/dL 0.72 (L)   eGFR mls/min/1.73/m2 >60   Glucose 74 - 100 mg/dL 103 (H)   Calcium 8.4 - 10.2 mg/dL 9.0   Alkaline Phosphatase 40  - 150 unit/L 108   PROTEIN TOTAL 6.4 - 8.3 gm/dL 7.2   Albumin 3.5 - 5.0 gm/dL 3.5   Albumin/Globulin Ratio 1.1 - 2.0 ratio 0.9 (L)   BILIRUBIN TOTAL <=1.5 mg/dL 1.2   AST 5 - 34 unit/L 54 (H)   (L): Data is abnormally low  (H): Data is abnormally high      Anesthesia Plan  Type of Anesthesia, risks & benefits discussed:    Anesthesia Type: Gen Natural Airway, Regional  Intra-op Monitoring Plan: Standard ASA Monitors  Post Op Pain Control Plan: IV/PO Opioids PRN and peripheral nerve block  Induction:  IV  Airway Plan: Direct, Post-Induction  Informed Consent: Informed consent signed with the Patient and all parties understand the risks and agree with anesthesia plan.  All questions answered. Patient consented to blood products? No  ASA Score: 1 Emergent  Day of Surgery Review of History & Physical: H&P Update referred to the surgeon/provider.  Anesthesia Plan Notes: Nasal cannula vs facemask supplemental oxygenation / IV Propofol Sedation  Axillary Block  Poss. Convert to GA/LMA    Ready For Surgery From Anesthesia Perspective.     .

## 2022-09-24 NOTE — NURSING
Explained all dc info with pt and family. Answered all questions, and did another set of VS. See chart. Pt pain rated at 4. Both pt and family had no questions at this time. Pt down via wc to family vehicle with no acute issues.

## 2022-09-24 NOTE — OP NOTE
Operative Note    Patient Information:  Mynor Tang    Date of Surgery:  09/23/2022    Surgeon:  Carlos Thompson MD    Assistant:  None    Pre-operative Diagnosis:  Traumatic partial amputation left little finger with table saw     Post-operative Diagnosis:  Open fracture left little finger proximal phalanx  Left little finger flexor digitorum profundus laceration in zone 2  Left little finger flexor digitorum superficialis laceration in zone 2  Left little finger extensor tendon laceration   Left little finger ulnar digital nerve laceration  Left little finger ulnar digital artery laceration      Procedure Performed:  Excisional debridement at site of open fracture including skin, subcutaneous tissue, fascia, muscle, bone left small finger proximal phalanx 68727  Open treatment left small proximal phalanx fracture 42089  Left small finger flexor digitorum profundus tendon repair in zone 2 CPT 38740  Left open carpal tunnel release 22508  Left small finger extensor tendon repair 69203  Left small finger ulnar digital nerve reconstruction with nerve allograft 46847  Use of intraoperative microscope for micro surgery 22699  Use of intraoperative fluoroscopy for decision making 34157  Application of static short arm splint 35899   Simple wound repair left hand traumatic wound 4cm 29690    Anesthesia:  MAC with regional block    Complications:  None    Blood Loss:  10 ml    Specimens:  None    Implants:  Implant Name Type Inv. Item Serial No.  Lot No. LRB No. Used Action   GRAFT AVANCE NERVE [id number] - [id number]  GRAFT AVANCE NERVE [id number]  AXOGEN [id number] Left 1 Implanted   Fibrin Sealant Vistaseal    [id number] ETHICON  Left 1 Implanted        Indications for Procedure:  Mynor Tang is a 26 y.o. male transferred from [facility] where a injury to his left little finger with a table saw.  He was seen in the emergency room where his finger was perfused but partly amputated.   Different treatment options were given to him including revision amputation versus fixing the finger.  He elected to fix the fingers that he could keep it.    I explained that surgery and the nature of their condition are not without risks. These include, but are not limited to, bleeding, infection, neurovascular compromise, malunion, nonunion, hardware complications, wound complications, scarring, cosmetic defects, need for later and/or repeated surgeries, pain, loss of ROM, loss of function, PTOA, deformity, functional abnormalities, stiffness, thromboembolic complications, compartment syndrome, loss of limb, loss of life, anesthetic complications, and other imponderables. I explained that these can occur despite the adequacy of treatments rendered, and that their risks are heightened given the nature of their condition. They verbalized understanding. They would like to continue with surgery at this time. If appropriate family was involved with surgical discussion. The patient expressed understanding of and agreement with the plan. Informed consent was obtained and signed prior going to the operative room.    Procedure in Detail:  Patient was brought to the operating room and placed under MAC anesthesia by the anesthesia department without difficulty. The patient was then placed in the supine position on the operating room table.     Time out was performed and all parties present agreed with correct patient, correct procedure, correct side, correct site. The left extremity was prepped and draped in standard normal fashion.     Pre-incision antibiotics were administered prior to skin incision.    The tourniquet was inflated to 250mm HG. A 15 blade was used to make the incision.    The traumatic wound was extended both proximally and distally.  Mid lateral flap was made of the distal portion of the little finger to expose the neurovascular bundles and tendons.  Proximally a radial based flap were used to identify  the neurovascular structures as well as the flexor sheath.    First an excisional debridement of the left little finger was performed at the site of the proximal phalanx open fracture.  Scissors were used to snip any devitalized skin, subcutaneous tissue, fascia, muscle.  A rongeur was used to trim the phalanx bone so that it was shortened about 2 mm for improved bony contact and less traction along the neurovascular structures.  The wound was irrigated with normal saline.  After the traumatized tissue was excised the wound overall appeared healthy.    Next and open reduction was performed of the proximal phalanx fracture after we had shortened with a rongeur.  20.05 K-wires were placed antegrade through the distal aspect of the proximal phalanx and then out of the skin at the level of the PIP joint.  They were then placed retrograde into the proximal fracture fragment.      Intraoperative fluoroscopy was used to confirm fracture fragment reduction as well as placement of hardware.    Attention was then turned to the flexor tendon sheath.  This flexor tendon injury was in zone 2 since there were 2 tendons within the sheath distally.  Proximally the sheath was empty.  A [employer] tendon retriever was passed through the sheath to try and retrieve either of the flexor tendons but was unsuccessful.  The decision was made to retrieve the tendon in the carpal tunnel.    An incision was made for standard carpal tunnel release using helps cardinal line as a reference in line with the 4th ray.  Dissection was carried through skin, subcutaneous tissue, palmar aponeurosis down to the transverse carpal ligament.  The transverse carpal ligament sharply incised with a knife and released both proximally and distally with scissors.  The median nerve was inspected and intact.  The flexor digitorum profundus tendon was retrieved from the carpal tunnel.  A Sams tendon Passer was used to pass this tendon through the fibers she and  brought out of the traumatic wound.  Two 4-0 Supramid sutures were used to perform an 8 strand modified Harmon repair of the left little finger flexor digitorum profundus in zone 2.    The flexor digitorum superficialis tendon was not repaired to avoid scarring at the tendon repair site    Next attention was turned to the dorsum of the little finger.  The extensor tendon was identified.  This was repaired with 2 figure-of-eight sutures with 4-0 Supramid suture.    Next the neurovascular bundle was explored on the radial side and found to be intact.  The neurovascular bundle on the ulnar side was completely transected.  Both the nerve and the artery were identified.      An operative microscope was draped and brought into the field.  The nerve ends were freshened sharply with micro scissors and trimmed back to healthy-appearing nerve.  There was a nerve gap of 25 mm.  An Axogen 1-2mm x 30mm graft was trimmed to the appropriate size and placed into the wound.  Under microscopic visualization, the proximal nerve repair to the allograft was performed with two 8-0 nylon suture.  The distal repair site was also performed with the same nylon suture.  Fibrin glue was placed at both the proximal and distal repair sites.    Next the ulnar digital artery was inspected.  The tourniquet was let down and the finger was perfused.  Since finger was perfused the decision was made to not repair the ulnar digital artery.    The wound was copiously irrigated.     The traumatic and wound from the table saw injury was closed with a combination of 4-0 nylon and 4-0 chromic sutures.  A simple closure was performed.    The surgical incisions were closed with 4-0 nylon suture.      5 mL of 2% lidocaine plain was used to perform a digital block and for vasodilation to the digit.    A soft dressing was applied, followed by a a static forearm based dorsal blocking short-arm splint extending to the PIP joint.    Patient was transferred to the  recovery room in stable condition.     Post-operative Plan:  Postoperatively the patient will receive a dose of IV Ancef.  He will then be discharged with pain medication and p.o. Keflex.  He will come back to see me in clinic in 2 weeks.

## 2022-09-24 NOTE — DISCHARGE SUMMARY
[facility] - Orthopaedics  Discharge Note  Short Stay    Procedure(s) (LRB):  Excisional debridement at site of open fracture left small finger proximal phalanx  Open reduction internal fixation left small proximal phalanx fracture  Left small finger flexor digitorum profundus tendon repair in zone 2  Left open carpal tunnel release  Left small finger extensor tendon repair  Left small finger ulnar digital nerve reconstruction with nerve allograft  Use of intraoperative microscope for micro surgery    OUTCOME: Patient tolerated treatment/procedure well without complication and is now ready for discharge.    DISPOSITION: Home or Self Care    FINAL DIAGNOSIS:  Laceration of blood vessel of left little finger    FOLLOWUP: In clinic in 2 weeks    DISCHARGE INSTRUCTIONS:    Discharge Procedure Orders   Leave dressing on - Keep it clean, dry, and intact until clinic visit         Clinical Reference Documents Added to Patient Instructions         Document    ACUTE COMPARTMENT SYNDROME DISCHARGE INSTRUCTIONS (Polish)    CEPHALEXIN, ADULT (Polish)    OXYCODONE, ADULT (Polish)            TIME SPENT ON DISCHARGE: 15 minutes

## 2022-09-26 ENCOUNTER — PATIENT OUTREACH (OUTPATIENT)
Dept: ADMINISTRATIVE | Facility: CLINIC | Age: 27
End: 2022-09-26

## 2022-09-26 NOTE — PROGRESS NOTES
C3 nurse attempted to contact Mynor Tang for a TCC post hospital discharge follow up call. No answer. No voicemail available. The patient has a scheduled [facility] appointment with Carlos Thompson MD, Orthopedics on 10/12/22 @ 8:45.

## 2022-09-26 NOTE — PROGRESS NOTES
C3 nurse attempted to contact Mynor Tang for a TCC post hospital discharge follow up call. No answer. No voicemail available. The patient has a scheduled [facility] appointment with Carlos Thompson MD, Orthopedics on 10/12/22 @ 8:45.

## 2022-09-27 NOTE — PROGRESS NOTES
C3 nurse attempted to contact Mynor Tang for a TCC post hospital discharge follow up call. No answer. No voicemail available. The patient has a scheduled [facility] appointment with Carlos Thompson MD, Orthopedics on 10/12/22 @ 8:45.

## 2022-10-03 NOTE — CLINICAL REVIEW
26-year-old male admitted on 2022 for left little finger laceration.  The patient is status post the followin. Excisional debridement at site of open fracture left small finger proximal phalanx  2. Open reduction internal fixation left small proximal phalanx fracture  3. Left small finger flexor digitorum profundus tendon repair in zone 2  4. Left open carpal tunnel release  5. Left small finger extensor tendon repair  6. Left small finger ulnar digital nerve reconstruction with nerve allograft  7. Use of intraoperative microscope for micro surgery      There were no perioperative complications for active comorbidities.  This is not an inpatient only designated procedure.  He was appropriate for observation LOC    MD JUAN DIEGO  , Physician Advisor

## 2022-10-12 ENCOUNTER — OFFICE VISIT (OUTPATIENT)
Dept: ORTHOPEDICS | Facility: CLINIC | Age: 27
End: 2022-10-12

## 2022-10-12 ENCOUNTER — HOSPITAL ENCOUNTER (OUTPATIENT)
Dept: RADIOLOGY | Facility: CLINIC | Age: 27
Discharge: HOME | End: 2022-10-12
Attending: STUDENT IN AN ORGANIZED HEALTH CARE EDUCATION/TRAINING PROGRAM

## 2022-10-12 VITALS
DIASTOLIC BLOOD PRESSURE: 80 MMHG | HEART RATE: 77 BPM | BODY MASS INDEX: 26.68 KG/M2 | SYSTOLIC BLOOD PRESSURE: 130 MMHG | HEIGHT: 67 IN | WEIGHT: 170 LBS

## 2022-10-12 DIAGNOSIS — M79.642 HAND PAIN, LEFT: ICD-10-CM

## 2022-10-12 DIAGNOSIS — M79.642 HAND PAIN, LEFT: Primary | ICD-10-CM

## 2022-10-12 PROCEDURE — 73130 XR HAND COMPLETE 3 VIEW LEFT: ICD-10-PCS | Mod: LT,,, | Performed by: STUDENT IN AN ORGANIZED HEALTH CARE EDUCATION/TRAINING PROGRAM

## 2022-10-12 PROCEDURE — 73130 X-RAY EXAM OF HAND: CPT | Mod: LT,,, | Performed by: STUDENT IN AN ORGANIZED HEALTH CARE EDUCATION/TRAINING PROGRAM

## 2022-10-12 PROCEDURE — 99024 PR POST-OP FOLLOW-UP VISIT: ICD-10-PCS | Mod: ,,, | Performed by: STUDENT IN AN ORGANIZED HEALTH CARE EDUCATION/TRAINING PROGRAM

## 2022-10-12 PROCEDURE — 99024 POSTOP FOLLOW-UP VISIT: CPT | Mod: ,,, | Performed by: STUDENT IN AN ORGANIZED HEALTH CARE EDUCATION/TRAINING PROGRAM

## 2022-10-12 NOTE — PROGRESS NOTES
Chief Complaint:  Follow-up left little finger injury      Surgeries:  09/23/2022  Excisional debridement at site of open fracture including skin, subcutaneous tissue, fascia, muscle, bone left small finger proximal phalanx 32174  Open treatment left small proximal phalanx fracture 21383  Left small finger flexor digitorum profundus tendon repair in zone 2 CPT 59028  Left open carpal tunnel release 90649  Left small finger extensor tendon repair 85588  Left small finger ulnar digital nerve reconstruction with nerve allograft 65114    History of present illness:    Patient is a 26-year-old male who presents for follow-up for a left little finger table saw injury that he sustained on 09/22/2022.  He is doing well.  Pain is controlled.  No drainage or purulence around the incision.  No fevers sweats or chills.    History reviewed. No pertinent past medical history.    Past Surgical History:   Procedure Laterality Date    FLEXOR TENDON REPAIR Left 9/23/2022    Procedure: REPAIR, TENDON, FLEXOR;  Surgeon: Carlos Thompson MD;  Location: [facility] OR;  Service: Orthopedics;  Laterality: Left;    NERVE REPAIR Left 9/23/2022    Procedure: REPAIR, NERVE;  Surgeon: Carlos Thompson MD;  Location: [facility] OR;  Service: Orthopedics;  Laterality: Left;    OPEN REDUCTION AND INTERNAL FIXATION (ORIF) OF INJURY OF FINGER Left 9/23/2022    Procedure: ORIF, FINGER;  Surgeon: Carlos Thompson MD;  Location: [facility];  Service: Orthopedics;  Laterality: Left;       Current Outpatient Medications   Medication Sig    oxyCODONE (ROXICODONE) 5 MG immediate release tablet Take 1 tablet (5 mg total) by mouth every 6 (six) hours as needed.     No current facility-administered medications for this visit.       Review of patient's allergies indicates:  No Known Allergies    History reviewed. No pertinent family history.    Social History     Socioeconomic History    Marital status: Single   Tobacco Use    Smoking status: Some Days     Packs/day: 0.25      "Types: Cigarettes    Smokeless tobacco: Never   Substance and Sexual Activity    Alcohol use: Not Currently     Comment: occasionally    Drug use: Never       Review of Systems:    Constitution:   Denies chills, fever, and sweats.  HENT:   Denies headaches or blurry vision.  Cardiovascular:  Denies chest pain or irregular heart beat.  Respiratory:   Denies cough or shortness of breath.  Gastrointestinal:  Denies abdominal pain, nausea, or vomiting.  Musculoskeletal:   Denies muscle cramps.  Neurological:   Denies dizziness or focal weakness.  Psychiatric/Behavior: Normal mental status.  Hematology/Lymph:  Denies bleeding problem or easy bruising/bleeding.  Skin:    Denies rash or suspicious lesions.    Examination:    Vital Signs:    Vitals:    10/12/22 0855   BP: 130/80   Pulse: 77   Weight: 77.1 kg (170 lb)   Height: 5' 7.01" (1.702 m)       Body mass index is 26.62 kg/m².    Constitution:   Well-developed, well nourished patient in no acute distress.  Neurological:   Alert and oriented x 3 and cooperative to examination.     Psychiatric/Behavior: Normal mental status.  Respiratory:   No shortness of breath.  Eyes:    Extraoccular muscles intact  Skin:    No scars, rash or suspicious lesions.    MSK:   Left little finger:  Surgical incisions and wounds are healing well without any drainage, dehiscence, purulence.  No cellulitis in the hand.  There is absent sensation on the ulnar side of finger.  Diminished sensation but present sensation on the radial side of the finger.  Finger has brisk capillary refill and is warm well perfused.                  Imaging:   X-ray left hand shows proximal phalanx fracture with 2 0.045 K-wires in place no evidence of loosening or failure     Assessment:  Status post above procedure    Plan:  He is doing well.  His wounds are healing well without any signs of infection at this time.  This fracture is not healed yet.  Will leave the wires in for another month.  I will give him a " ulnar gutter Exos brace that he should keep on at all times except to work on therapy.  I explained that he should especially be aggressive with moving his index long and ring fingers otherwise it will get stiff.  He has Ace strict lifting restriction of no weight with the left hand.  I explained that he is not to use this left hand for any work purposes.  I showed him some exercises that he can perform to keep his D IP and MP joints supple .  He lives in [place].  I will see him back in 1 month    Follow Up:  1 month  Xray at next visit:  Left hand

## 2022-11-09 ENCOUNTER — HOSPITAL ENCOUNTER (OUTPATIENT)
Dept: RADIOLOGY | Facility: CLINIC | Age: 27
Discharge: HOME | End: 2022-11-09
Attending: STUDENT IN AN ORGANIZED HEALTH CARE EDUCATION/TRAINING PROGRAM

## 2022-11-09 ENCOUNTER — OFFICE VISIT (OUTPATIENT)
Dept: ORTHOPEDICS | Facility: CLINIC | Age: 27
End: 2022-11-09

## 2022-11-09 VITALS
BODY MASS INDEX: 26.68 KG/M2 | DIASTOLIC BLOOD PRESSURE: 89 MMHG | HEIGHT: 67 IN | WEIGHT: 170 LBS | HEART RATE: 86 BPM | SYSTOLIC BLOOD PRESSURE: 137 MMHG

## 2022-11-09 DIAGNOSIS — S65.517D: ICD-10-CM

## 2022-11-09 DIAGNOSIS — S65.517D: Primary | ICD-10-CM

## 2022-11-09 DIAGNOSIS — M79.642 HAND PAIN, LEFT: ICD-10-CM

## 2022-11-09 PROCEDURE — 99024 POSTOP FOLLOW-UP VISIT: CPT | Mod: ,,, | Performed by: STUDENT IN AN ORGANIZED HEALTH CARE EDUCATION/TRAINING PROGRAM

## 2022-11-09 PROCEDURE — 99024 PR POST-OP FOLLOW-UP VISIT: ICD-10-PCS | Mod: ,,, | Performed by: STUDENT IN AN ORGANIZED HEALTH CARE EDUCATION/TRAINING PROGRAM

## 2022-11-09 PROCEDURE — 73130 XR HAND COMPLETE 3 VIEW LEFT: ICD-10-PCS | Mod: LT,,, | Performed by: STUDENT IN AN ORGANIZED HEALTH CARE EDUCATION/TRAINING PROGRAM

## 2022-11-09 PROCEDURE — 73130 X-RAY EXAM OF HAND: CPT | Mod: LT,,, | Performed by: STUDENT IN AN ORGANIZED HEALTH CARE EDUCATION/TRAINING PROGRAM

## 2022-11-09 NOTE — PROGRESS NOTES
Chief Complaint:  Follow-up left little finger injury      Surgeries:  09/23/2022  Excisional debridement at site of open fracture including skin, subcutaneous tissue, fascia, muscle, bone left small finger proximal phalanx 71967  Open treatment left small proximal phalanx fracture 71669  Left small finger flexor digitorum profundus tendon repair in zone 2 CPT 36386  Left open carpal tunnel release 29092  Left small finger extensor tendon repair 59127  Left small finger ulnar digital nerve reconstruction with nerve allograft 29441    History of present illness:    Patient is a 26-year-old male who presents for follow-up for a left little finger table saw injury that he sustained on 09/22/2022.  He is doing well.  Pain is controlled.  No drainage or purulence around the incision.  No fevers sweats or chills.    No past medical history on file.    Past Surgical History:   Procedure Laterality Date    FLEXOR TENDON REPAIR Left 9/23/2022    Procedure: REPAIR, TENDON, FLEXOR;  Surgeon: Carlos Thompson MD;  Location: [facility];  Service: Orthopedics;  Laterality: Left;    NERVE REPAIR Left 9/23/2022    Procedure: REPAIR, NERVE;  Surgeon: Carlos Thompson MD;  Location: [facility] OR;  Service: Orthopedics;  Laterality: Left;    OPEN REDUCTION AND INTERNAL FIXATION (ORIF) OF INJURY OF FINGER Left 9/23/2022    Procedure: ORIF, FINGER;  Surgeon: Carlos Thompson MD;  Location: [facility];  Service: Orthopedics;  Laterality: Left;       Current Outpatient Medications   Medication Sig    oxyCODONE (ROXICODONE) 5 MG immediate release tablet Take 1 tablet (5 mg total) by mouth every 6 (six) hours as needed.     No current facility-administered medications for this visit.       Review of patient's allergies indicates:  No Known Allergies    No family history on file.    Social History     Socioeconomic History    Marital status: Single   Tobacco Use    Smoking status: Some Days     Packs/day: 0.25     Types: Cigarettes    Smokeless tobacco:  "Never    Tobacco comments:     Smokes 1 or 2 every few days   Substance and Sexual Activity    Alcohol use: Not Currently     Comment: occasionally    Drug use: Never       Review of Systems:    Constitution:   Denies chills, fever, and sweats.  HENT:   Denies headaches or blurry vision.  Cardiovascular:  Denies chest pain or irregular heart beat.  Respiratory:   Denies cough or shortness of breath.  Gastrointestinal:  Denies abdominal pain, nausea, or vomiting.  Musculoskeletal:   Denies muscle cramps.  Neurological:   Denies dizziness or focal weakness.  Psychiatric/Behavior: Normal mental status.  Hematology/Lymph:  Denies bleeding problem or easy bruising/bleeding.  Skin:    Denies rash or suspicious lesions.    Examination:    Vital Signs:    Vitals:    11/09/22 1016   BP: 137/89   Pulse: 86   Weight: 77.1 kg (170 lb)   Height: 5' 7" (1.702 m)       Body mass index is 26.63 kg/m².    Constitution:   Well-developed, well nourished patient in no acute distress.  Neurological:   Alert and oriented x 3 and cooperative to examination.     Psychiatric/Behavior: Normal mental status.  Respiratory:   No shortness of breath.  Eyes:    Extraoccular muscles intact  Skin:    No scars, rash or suspicious lesions.    MSK:   Left little finger:  Surgical incisions and wounds are healing well without any drainage, dehiscence, purulence.  No cellulitis in the hand.  There is absent sensation on the ulnar side of finger.  Diminished sensation but present sensation on the radial side of the finger.  Finger has brisk capillary refill and is warm well perfused.                                Imaging:   X-ray left hand shows proximal phalanx fracture with 2 0.045 K-wires in place no evidence of loosening or failure     Assessment:  Status post above procedure    Plan:  He is doing well.  I will pull the wire today. I will give him a referral for therapy to start working on range of motion of this digit.    Follow Up:  1 month  Xray " at next visit:  Left hand

## 2022-11-16 ENCOUNTER — OFFICE VISIT (OUTPATIENT)
Dept: ORTHOPEDICS | Facility: CLINIC | Age: 27
End: 2022-11-16

## 2022-11-16 ENCOUNTER — HOSPITAL ENCOUNTER (OUTPATIENT)
Dept: RADIOLOGY | Facility: CLINIC | Age: 27
Discharge: HOME | End: 2022-11-16
Attending: STUDENT IN AN ORGANIZED HEALTH CARE EDUCATION/TRAINING PROGRAM

## 2022-11-16 VITALS — WEIGHT: 170 LBS | BODY MASS INDEX: 26.68 KG/M2 | HEIGHT: 67 IN

## 2022-11-16 DIAGNOSIS — M79.642 HAND PAIN, LEFT: ICD-10-CM

## 2022-11-16 DIAGNOSIS — M79.642 HAND PAIN, LEFT: Primary | ICD-10-CM

## 2022-11-16 PROCEDURE — 73120 XR HAND 2 VIEW LEFT: ICD-10-PCS | Mod: LT,,, | Performed by: STUDENT IN AN ORGANIZED HEALTH CARE EDUCATION/TRAINING PROGRAM

## 2022-11-16 PROCEDURE — 99024 POSTOP FOLLOW-UP VISIT: CPT | Mod: ,,, | Performed by: STUDENT IN AN ORGANIZED HEALTH CARE EDUCATION/TRAINING PROGRAM

## 2022-11-16 PROCEDURE — 73120 X-RAY EXAM OF HAND: CPT | Mod: LT,,, | Performed by: STUDENT IN AN ORGANIZED HEALTH CARE EDUCATION/TRAINING PROGRAM

## 2022-11-16 PROCEDURE — 99024 PR POST-OP FOLLOW-UP VISIT: ICD-10-PCS | Mod: ,,, | Performed by: STUDENT IN AN ORGANIZED HEALTH CARE EDUCATION/TRAINING PROGRAM

## 2022-11-16 NOTE — PROGRESS NOTES
Patient is here to have a 2nd K-wire pulled out of the small finger.  I performed a digital block and removed the 2nd K-wire.  He can start working on therapy in [place].  I can see him back in 2-3 months

## 2022-11-16 NOTE — LETTER
November 16, 2022    Mynor Tang  [address]  [place] [address]              [facility]  Orthopedics  [address], [address]  [place] [address]  Phone: [phone]  Fax: [phone]   November 16, 2022     Patient: Mynor Tang   YOB: 1995   Date of Visit: 11/16/2022       To Whom it May Concern:    Mynor Tang was seen in my clinic on 11/16/2022.     If you have any questions or concerns, please don't hesitate to call.    Sincerely,         Carlos Thompson MD

## (undated) DEVICE — CUFF ATS 2 PORT SNGL BLDR 18IN

## (undated) DEVICE — SUT ETHILON 4-0 PS4 18 BLK

## (undated) DEVICE — SUT 4-0 ETHILON 18 PS-2

## (undated) DEVICE — DRAPE STERI U-SHAPED 47X51IN

## (undated) DEVICE — GAUZE SPONGE 4X4 12PLY

## (undated) DEVICE — APPLICATOR CHLORAPREP ORN 26ML

## (undated) DEVICE — SUT SUPRAMID 3-0 BB 12X.375IN

## (undated) DEVICE — Device

## (undated) DEVICE — SPLINT PLASTER F.S 4INX15IN

## (undated) DEVICE — BANDAGE VELCLOSE ELAS 3INX5YD

## (undated) DEVICE — SOL NACL IRR 1000ML BTL

## (undated) DEVICE — GLOVE PROTEXIS LTX MICRO  7

## (undated) DEVICE — SPONGE DERMACEA GAUZE 4X4

## (undated) DEVICE — BANDAGE VELCLOSE ELAS 2INX5YD

## (undated) DEVICE — GOWN POLY REINF X-LONG 2XL

## (undated) DEVICE — SUT 4-0 CHROMIC GUT / P-3

## (undated) DEVICE — SUT ETHILON 8-0 BLK MONO BV

## (undated) DEVICE — SPONGE KERLIX NS 12-PLY 4X4IN

## (undated) DEVICE — DRESSING GAUZE OIL EMUL 3X8

## (undated) DEVICE — DRAPE HAND STERILE

## (undated) DEVICE — PAD ABDOMINAL STERILE 5X9IN

## (undated) DEVICE — BANDAGE ACE NON LATEX 3IN

## (undated) DEVICE — DRESSING XEROFORM FOIL PK 1X8

## (undated) DEVICE — SEALANT VISTASEAL FIBRIN 10ML

## (undated) DEVICE — SPLINT PLASTER F.S. 3INX15IN

## (undated) DEVICE — PADDING CAST AST SOFT-ROLL 3X4

## (undated) DEVICE — DRESSING N ADH OIL EMUL 3X8

## (undated) DEVICE — ELECTRODE PATIENT RETURN DISP